# Patient Record
Sex: FEMALE
[De-identification: names, ages, dates, MRNs, and addresses within clinical notes are randomized per-mention and may not be internally consistent; named-entity substitution may affect disease eponyms.]

---

## 2023-05-25 ENCOUNTER — APPOINTMENT (OUTPATIENT)
Dept: OTOLARYNGOLOGY | Facility: CLINIC | Age: 53
End: 2023-05-25

## 2023-05-25 PROBLEM — Z00.00 ENCOUNTER FOR PREVENTIVE HEALTH EXAMINATION: Status: ACTIVE | Noted: 2023-05-25

## 2023-05-26 ENCOUNTER — APPOINTMENT (OUTPATIENT)
Dept: OTOLARYNGOLOGY | Facility: CLINIC | Age: 53
End: 2023-05-26
Payer: MEDICAID

## 2023-05-26 VITALS — WEIGHT: 128 LBS

## 2023-05-26 DIAGNOSIS — J34.89 OTHER SPECIFIED DISORDERS OF NOSE AND NASAL SINUSES: ICD-10-CM

## 2023-05-26 PROCEDURE — 30100 INTRANASAL BIOPSY: CPT

## 2023-05-26 PROCEDURE — 99203 OFFICE O/P NEW LOW 30 MIN: CPT | Mod: 25

## 2023-05-26 RX ORDER — DOXYCYCLINE HYCLATE 100 MG/1
100 CAPSULE ORAL
Qty: 60 | Refills: 0 | Status: ACTIVE | COMMUNITY
Start: 2023-05-15

## 2023-05-26 RX ORDER — FLUOXETINE HYDROCHLORIDE 20 MG/1
20 TABLET ORAL
Qty: 30 | Refills: 0 | Status: ACTIVE | COMMUNITY
Start: 2023-05-19

## 2023-05-26 RX ORDER — MELOXICAM 15 MG/1
15 TABLET ORAL
Qty: 30 | Refills: 0 | Status: ACTIVE | COMMUNITY
Start: 2023-05-18

## 2023-05-30 LAB — CORE LAB BIOPSY: NORMAL

## 2023-06-15 NOTE — HISTORY OF PRESENT ILLNESS
[de-identified] : Initial visit to me.\par Her chief complaint is "growth in her nose".  She feels this is grown over time.  In fact she feels it obstructing her nasal airway at times.

## 2023-06-15 NOTE — PROCEDURE
[FreeTextEntry1] : Excisional biopsy of columellar lesion. [FreeTextEntry2] : Columellar lesion [FreeTextEntry3] : She was prepped and draped in usual fashion.  I injected approximately 1 cc of 1% lidocaine with 1 100,000 epinephrine.  Then using a Amadeo-Cut biopsy forcep the lesion was excised.  Bleeding was controlled with pressure.